# Patient Record
Sex: FEMALE
[De-identification: names, ages, dates, MRNs, and addresses within clinical notes are randomized per-mention and may not be internally consistent; named-entity substitution may affect disease eponyms.]

---

## 2020-12-31 PROBLEM — Z00.00 ENCOUNTER FOR PREVENTIVE HEALTH EXAMINATION: Status: ACTIVE | Noted: 2020-12-31

## 2021-01-13 ENCOUNTER — LABORATORY RESULT (OUTPATIENT)
Age: 51
End: 2021-01-13

## 2021-01-13 ENCOUNTER — APPOINTMENT (OUTPATIENT)
Dept: NEPHROLOGY | Facility: CLINIC | Age: 51
End: 2021-01-13
Payer: COMMERCIAL

## 2021-01-13 ENCOUNTER — TRANSCRIPTION ENCOUNTER (OUTPATIENT)
Age: 51
End: 2021-01-13

## 2021-01-13 VITALS — TEMPERATURE: 98.7 F | WEIGHT: 141.2 LBS

## 2021-01-13 VITALS — DIASTOLIC BLOOD PRESSURE: 75 MMHG | HEART RATE: 72 BPM | SYSTOLIC BLOOD PRESSURE: 112 MMHG

## 2021-01-13 DIAGNOSIS — E87.0 HYPEROSMOLALITY AND HYPERNATREMIA: ICD-10-CM

## 2021-01-13 DIAGNOSIS — E05.90 THYROTOXICOSIS, UNSPECIFIED W/OUT THYROTOXIC CRISIS OR STORM: ICD-10-CM

## 2021-01-13 DIAGNOSIS — R31.29 OTHER MICROSCOPIC HEMATURIA: ICD-10-CM

## 2021-01-13 DIAGNOSIS — E83.52 HYPERCALCEMIA: ICD-10-CM

## 2021-01-13 DIAGNOSIS — E87.2 ACIDOSIS: ICD-10-CM

## 2021-01-13 DIAGNOSIS — Z78.9 OTHER SPECIFIED HEALTH STATUS: ICD-10-CM

## 2021-01-13 PROCEDURE — 99072 ADDL SUPL MATRL&STAF TM PHE: CPT

## 2021-01-13 PROCEDURE — 99204 OFFICE O/P NEW MOD 45 MIN: CPT

## 2021-01-13 RX ORDER — OMEPRAZOLE 40 MG/1
40 CAPSULE, DELAYED RELEASE ORAL
Qty: 90 | Refills: 3 | Status: ACTIVE | COMMUNITY
Start: 2021-01-13

## 2021-01-13 NOTE — HISTORY OF PRESENT ILLNESS
[FreeTextEntry1] : Kindly referred by Dr. Olaf Mckinnon for hypercalcemia.\par \par * The previous records were reviewed and summarized. COn 12/16/20 her calcium was 10.5 with an albumin of 5.1, sodium 145, and bicarb 18. TSH and CBC were normal. Abd ultrasound showed kidneys 12/11.9 without calculi or other abnormality. She doesn't recall hearing about an abnormal sodium, bicarb, or CO2 previously.\par \par * 2 months ago she developed intermittent left upper quadrant discomfort and was evaluated by GI and had an abdominal ultrasound, which is unremarkable. No pain currently for 1 month. At the same time, she noticed an increase in urination. She feels like if she drinks 8 cups of water she pees 10 - 16 cups. Her urine is very light. She believes she drinks 100 ounces daily. She urinates every hour during the day but doesn't urinate at night.\par \par * Omeprazole started 1.5 months ago. \par \par * She has been told of microscopic hematuria for over 10 years. She is unaware of protein in urine. No FH of hematuria. No urology or nephrologist evaluation. Creatinine 0.6. \par \par *  Hyperthyroidism was treated 2 year ago. No thyroid replacement required. \par

## 2021-01-13 NOTE — ASSESSMENT
[FreeTextEntry1] : # Possible hypercalcemia.\par * Confirm with ionized calcium.\par * Check monoclonal protein evaluation, PTH, vitamin D, quant gold.\par \par # Possible hypernatremia and possibly borderline polyuria.\par * Recheck serum sodium, urine and serum osm, and copeptin (to evaluate for DI). \par * DI seems unlikely as she does not urinate at night. \par * If she has hypercalcemia, this can also lead to polyuria.\par * Bladder scan performed. No post void residual. \par \par # Possible acidosis.\par * This may be a lab error.\par * Recheck with urine pH and urine anion gap. \par \par # Persistent microscopic hematuria with normal renal function. 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 \par \par The patient has been counseled that they have a a significant medical condition and regular office followup (at least every 4 months) is essential for monitoring, and that it is their responsibility to make follow up appointments. The patient also understands that they must never stop or change their medications without discussing this with me (or another physician). A counseling information sheet has been given (currently or previously, in-person or electronically). All their questions were answered.

## 2021-01-13 NOTE — CONSULT LETTER
[FreeTextEntry1] : Dear Dr. Mckinnon,\par \par I had the pleasure of seeing Emma Bledsoe in consultation for hypercalcemia. My note is attached.\par \par Thank you for the opportunity to participate in the care of your patient.\par \par Please call me on my cell phone at [phone] or in the office at [phone] if you have any questions.\par \par Best regards,\par \par Venancio\par \par Venancio Prasad MD, FACP, FASN\par [address], [place]\par [email or web address] Office: [phone]\par Mobile: [phone]

## 2021-01-13 NOTE — PHYSICAL EXAM
[General Appearance - Alert] : alert [General Appearance - In No Acute Distress] : in no acute distress [Auscultation Breath Sounds / Voice Sounds] : lungs were clear to auscultation bilaterally [Heart Rate And Rhythm] : heart rate was normal and rhythm regular [Heart Sounds] : normal S1 and S2 [Heart Sounds Gallop] : no gallops [Murmurs] : no murmurs [Heart Sounds Pericardial Friction Rub] : no pericardial rub [Edema] : there was no peripheral edema [Veins - Varicosity Changes] : there were no varicosital changes [Bowel Sounds] : normal bowel sounds [Abdomen Soft] : soft [Abdomen Tenderness] : non-tender [Abdomen Mass (___ Cm)] : no abdominal mass palpated [Skin Color & Pigmentation] : normal skin color and pigmentation [Skin Turgor] : normal skin turgor [] : no rash

## 2021-01-16 LAB
24R-OH-CALCIDIOL SERPL-MCNC: 91.3 PG/ML
25(OH)D3 SERPL-MCNC: 47.7 NG/ML
ALBUMIN MFR SERPL ELPH: 64.9 %
ALBUMIN SERPL ELPH-MCNC: 5.3 G/DL
ALBUMIN SERPL-MCNC: 5.2 G/DL
ALBUMIN/GLOB SERPL: 1.9 RATIO
ALBUPE: 26.1 %
ALP BLD-CCNC: 109 U/L
ALPHA1 GLOB MFR SERPL ELPH: 2.9 %
ALPHA1 GLOB SERPL ELPH-MCNC: 0.2 G/DL
ALPHA1UPE: 34.2 %
ALPHA2 GLOB MFR SERPL ELPH: 8.2 %
ALPHA2 GLOB SERPL ELPH-MCNC: 0.7 G/DL
ALPHA2UPE: 18.8 %
ALT SERPL-CCNC: 26 U/L
ANION GAP SERPL CALC-SCNC: 15 MMOL/L
APPEARANCE: CLEAR
AST SERPL-CCNC: 23 U/L
B-GLOBULIN MFR SERPL ELPH: 10.1 %
B-GLOBULIN SERPL ELPH-MCNC: 0.8 G/DL
BACTERIA: NEGATIVE
BASOPHILS # BLD AUTO: 0.04 K/UL
BASOPHILS NFR BLD AUTO: 0.9 %
BETAUPE: 10.8 %
BILIRUB SERPL-MCNC: 0.2 MG/DL
BILIRUBIN URINE: NEGATIVE
BLOOD URINE: NEGATIVE
BUN SERPL-MCNC: 13 MG/DL
C3 SERPL-MCNC: 134 MG/DL
C4 SERPL-MCNC: 27 MG/DL
CA-I SERPL-SCNC: 1.36 MMOL/L
CALCIUM SERPL-MCNC: 10.3 MG/DL
CALCIUM SERPL-MCNC: 10.3 MG/DL
CHLORIDE ?TM UR-SCNC: 41 MMOL/L
CHLORIDE SERPL-SCNC: 105 MMOL/L
CO2 SERPL-SCNC: 23 MMOL/L
COLOR: YELLOW
CREAT SERPL-MCNC: 0.73 MG/DL
CREAT SPEC-SCNC: 19 MG/DL
CREAT SPEC-SCNC: 19 MG/DL
CREAT/PROT UR: 0.8 RATIO
DEPRECATED KAPPA LC FREE/LAMBDA SER: 1.08 RATIO
DSDNA AB SER-ACNC: <12 IU/ML
ENA SS-A AB SER IA-ACNC: 0.2 AL
ENA SS-B AB SER IA-ACNC: <0.2 AL
EOSINOPHIL # BLD AUTO: 0.07 K/UL
EOSINOPHIL NFR BLD AUTO: 1.7 %
GAMMA GLOB FLD ELPH-MCNC: 1.1 G/DL
GAMMA GLOB MFR SERPL ELPH: 13.9 %
GAMMAUPE: 10.1 %
GLUCOSE QUALITATIVE U: NEGATIVE
GLUCOSE SERPL-MCNC: 86 MG/DL
HBV SURFACE AG SER QL: NONREACTIVE
HCT VFR BLD CALC: 41.5 %
HCV AB SER QL: NONREACTIVE
HCV S/CO RATIO: 0.07 S/CO
HGB BLD-MCNC: 13 G/DL
HIV1+2 AB SPEC QL IA.RAPID: NONREACTIVE
HYALINE CASTS: 0 /LPF
IGA 24H UR QL IFE: NORMAL
IGA SER QL IEP: 117 MG/DL
IGG SER QL IEP: 1199 MG/DL
IGM SER QL IEP: 57 MG/DL
IMM GRANULOCYTES NFR BLD AUTO: 0.2 %
INTERPRETATION SERPL IEP-IMP: NORMAL
KAPPA LC 24H UR QL: NORMAL
KAPPA LC CSF-MCNC: 1.01 MG/DL
KAPPA LC SERPL-MCNC: 1.09 MG/DL
KETONES URINE: NEGATIVE
LEUKOCYTE ESTERASE URINE: NEGATIVE
LYMPHOCYTES # BLD AUTO: 2.24 K/UL
LYMPHOCYTES NFR BLD AUTO: 53.1 %
M PROTEIN SPEC IFE-MCNC: NORMAL
M TB IFN-G BLD-IMP: NEGATIVE
MAN DIFF?: NORMAL
MCHC RBC-ENTMCNC: 29.7 PG
MCHC RBC-ENTMCNC: 31.3 GM/DL
MCV RBC AUTO: 95 FL
MICROALBUMIN 24H UR DL<=1MG/L-MCNC: <1.2 MG/DL
MICROALBUMIN/CREAT 24H UR-RTO: NORMAL MG/G
MICROSCOPIC-UA: NORMAL
MONOCYTES # BLD AUTO: 0.3 K/UL
MONOCYTES NFR BLD AUTO: 7.1 %
MPO AB + PR3 PNL SER: NORMAL
NEUTROPHILS # BLD AUTO: 1.56 K/UL
NEUTROPHILS NFR BLD AUTO: 37 %
NITRITE URINE: NEGATIVE
OSMOLALITY SERPL: 291 MOSMOL/KG
OSMOLALITY UR: 203 MOSM/KG
PARATHYROID HORMONE INTACT: 33 PG/ML
PH URINE: 7
PLATELET # BLD AUTO: 307 K/UL
POTASSIUM SERPL-SCNC: 4 MMOL/L
POTASSIUM UR-SCNC: 10.2 MMOL/L
PROT PATTERN 24H UR ELPH-IMP: NORMAL
PROT SERPL-MCNC: 8 G/DL
PROT UR-MCNC: 14 MG/DL
PROT UR-MCNC: <4 MG/DL
PROT UR-MCNC: <4 MG/DL
PROTEIN URINE: NEGATIVE
QUANTIFERON TB PLUS MITOGEN MINUS NIL: >10 IU/ML
QUANTIFERON TB PLUS NIL: 0.04 IU/ML
QUANTIFERON TB PLUS TB1 MINUS NIL: -0.01 IU/ML
QUANTIFERON TB PLUS TB2 MINUS NIL: -0.01 IU/ML
RBC # BLD: 4.37 M/UL
RBC # FLD: 12.6 %
RED BLOOD CELLS URINE: 1 /HPF
RHEUMATOID FACT SER QL: <10 IU/ML
SODIUM ?TM SUB UR QN: 47 MMOL/L
SODIUM SERPL-SCNC: 142 MMOL/L
SPECIFIC GRAVITY URINE: 1
SQUAMOUS EPITHELIAL CELLS: 0 /HPF
UROBILINOGEN URINE: NORMAL
VIT B12 SERPL-MCNC: 887 PG/ML
WBC # FLD AUTO: 4.22 K/UL
WHITE BLOOD CELLS URINE: 0 /HPF

## 2021-01-29 LAB
MISCELLANEOUS TEST: NORMAL
PROC NAME: NORMAL

## 2021-05-21 ENCOUNTER — APPOINTMENT (OUTPATIENT)
Dept: NEPHROLOGY | Facility: CLINIC | Age: 51
End: 2021-05-21